# Patient Record
Sex: MALE
[De-identification: names, ages, dates, MRNs, and addresses within clinical notes are randomized per-mention and may not be internally consistent; named-entity substitution may affect disease eponyms.]

---

## 2017-10-19 NOTE — OR
SURGEON:

Anabela Clemons D.O.

 

DATE OF PROCEDURE:  10/19/2017

 

OR STAFF PRESENT:

1. ISABEL Cabral RN.

2. SEVERO West RN.

3. KARIN Claire RN.

 

RADIOLOGY TECH:

BENNY Del Valle.

 

WOUND CLASSIFICATION:

I.

 

PREOPERATIVE DIAGNOSES:

1. Multilevel degenerative disk disease.

2. Herniated disk.

3. Chronic low back pain.

 4. Lumbar radiculopathy



POSTOPERATIVE DIAGNOSES:

1. Multilevel degenerative disk disease.

2. Herniated disk.

3. Chronic low back pain.

 4. Lumbar radiculopathy



PROCEDURES PERFORMED:

1. Intralaminar epidural steroid injection at L3-L4.

2. Fluoroscopic guidance for needle placement.

3. Local with oral Valium for sedation.

 

SCREENING QUESTIONS:

The patient answered "no" to all of the following questions:

1. Are  you allergic to latex?

2. Do you have a bleeding disorder?

3. Do you have any current local or systemic infections?

4. Are you taking any anti-inflammatories or blood thinners?

5. Do you have any joint replacements, heart valve replacements, or a

    pacemaker?

 

DESCRIPTION OF PROCEDURE:

The patient had the procedure thoroughly explained including all possible risks,

benefits and alternatives.  Consent was signed in my clinic indicating

understanding and willingness to proceed.  The patient presented to [facility] and was escorted to the dressing room to disrobe and

change into a hospital gown.  Preoperative vital signs were taken and stable.

The patient reported that Valium was taken prior to the procedure.

 

The patient was brought back to the procedure room and placed in the prone

position on the procedure room table.  A pillow was placed under the hips in

order to flatten the lumbar lordosis.  The back was prepped with ChloraPrep and

sterilely draped.  All personnel in the operating room were dressed in

appropriate attire including surgical scrubs, head and shoe covers.  This was to

ensure sterility while in the treatment room.  During the time fluoroscopy was

in use, all personnel in the operating room wore lead shields with thyroid

collars.  Sterile technique was used throughout the procedure. The patient was

awake and conversant throughout the procedure.  There was no evidence of

infection at the site of needle insertion.  Skeletal landmarks were identified

under fluoroscopy for the lumbar epidural.

 

Skin was anesthetized with 2% lidocaine with a sterile 27-gauge 1.5 inch needle.

Then a 20-gauge Tuohy epidural needle was placed in the epidural space with loss

of resistance technique under fluoroscopic guidance.  No heme, cerebrospinal

fluid, or paresthesias were noted.  Isovue-200 contrast dye was injected in 0.2

cubic centimeter increments and seen to outline the epidural space in both AP

and lateral views.  There was no intravascular flow pattern observed under live

fluoroscopy.  Then 12 milligrams of Celestone was slowly injected after negative

aspiration.  The patient tolerated the procedure well.  Vital signs were stable

during and after the procedure.

 

The staff escorted the patient to the recovery area and the patient was released

in stable condition after a brief stay in the recovery room monitored by the

nurse.  The patient was given both oral and written discharge and follow up

instructions with recommendation to follow up given for 2-3 weeks.

 

The patient voiced understanding including understanding of those signs and

symptoms that would require emergency care.  The patient knows how to contact

the office if there are any additional problems or questions in the meantime.

 

PREOPERATIVE PAIN:

9/10.

 

POSTOPERATIVE PAIN:

7/10.

 

PLAN:

Followup in the Pain Clinic in 3 weeks.

 

 

WAYLON / MARIA EUGENIA

DD:  10/19/2017 14:18:53

DT:  10/19/2017 18:17:15

Job #:  [id number]

CASSIA

## 2017-10-20 NOTE — MR
EXAMINATION: MRI lumbar spine with and without contrast

 

HISTORY: Back pain

 

COMPARISON: MRI dated 7/24/2017

 

TECHNIQUE: Multiplanar and multisequence imaging obtained of the lumbar spine before and following th
e administration of 17 mL of MultiHance.

 

FINDINGS: The lumbar spinal alignment is normal. The vertebral body heights appear well maintained. B
one mineralization is normal. Distal spinal cord appears normal and the conus terminates at L1. Visua
lized retroperitoneal structures are normal. The SI joints are symmetric. There are a few small T1 an
d T2 dark signal foci within the posterior epidural space at L3. No abnormal enhancement is noted.

 

T12-L1: Unremarkable.

L1-L2: Unremarkable.

L2-L3: Moderate broad-based disc protrusion with a slight inferior extrusion component, grossly uncha
nged. There is mild underlying spinal canal stenosis. No significant neural foraminal stenosis.

L3-L4: Tiny diffuse disc bulge without significant spinal canal or neural foraminal stenosis.

L4-L5: Small diffuse disc bulge with minimal spinal canal stenosis. Mild bilateral neural foraminal s
tenosis.

L5-S1: Unremarkable.

 

IMPRESSION: 

1. Multilevel degenerative disc disease most prominent at L2-L3, not significantly changed from the p
rior MRI.

2. Several small dark foci within the posterior epidural space at L3. Likely small foci of air, not u
nexpected, from recent epidural steroid injection.

2. No acute findings demonstrated.

## 2017-10-20 NOTE — EDM.PDOC
ED HPI GENERAL MEDICAL PROBLEM





- General


Stated Complaint: PAIN AND WEAKNESS


Time Seen by Provider: 10/20/17 11:12





- History of Present Illness


INITIAL COMMENTS - FREE TEXT/NARRATIVE: 





HISTORY AND PHYSICAL:





History of present illness:


Patient's 22-year-old white male with history of chronic back pain with 

multilevel disc disease with an epidural injection yesterday by pain management 

and now comes with increasing pain and weakness he has had some pain and 

weakness but states this is worse since the injection seen by his pain 

management physician who referred him here for evaluation and imaging as 

indicated. He denies incontinence or retention of bowel or bladder or other 

concern at this point no fever or chills





Review of systems: 


As per history of present illness and below otherwise all systems reviewed and 

negative.





Past medical history: 


As per history of present illness and as reviewed below otherwise 

noncontributory.





Surgical history: 


As per history of present illness and as reviewed below otherwise 

noncontributory.





Social history: 


No reported history of drug or alcohol abuse.





Family history: 


As per history of present illness and as reviewed below otherwise 

noncontributory.





Physical exam:


HEENT: Atraumatic, normocephalic, pupils reactive, negative for conjunctival 

pallor or scleral icterus, mucous membranes moist, throat clear, neck supple, 

nontender, trachea midline.


Lungs: Clear to auscultation, breath sounds equal bilaterally, chest nontender.


Heart: S1S2, regular, negative for clicks, rubs, or JVD.


Abdomen: Soft, nondistended, nontender. Negative for masses or 

hepatosplenomegaly. Negative for costovertebral tenderness.


Pelvis: Stable nontender.


Genitourinary: Deferred.


Rectal: Deferred.


Extremities: Atraumatic, negative for cords or calf pain. Neurovascular 

unremarkable.


Neuro: Awake, alert, oriented. Cranial nerves II through XII unremarkable. 

Cerebellum unremarkable. Motor and sensory unremarkable throughout. Exam 

nonfocal.


Back: Patient has the injection site noted at the L2 level there is no erythema 

no fluctuance no induration no point vertebral body tenderness motor and 

sensory exam are unremarkable there is no perineal numbness and deep tendon 

reflexes are 2+ bilateral


Diagnostics:


CBC CMP MRI lumbar spine





Therapeutics:


Normal saline Dilaudid 1 mg IV Zofran 4 mg IV





Impression: 


#1 degenerative disc disease #2 observation 24-hour status post epidural 

injection with increased pain/weakness





Definitive disposition and diagnosis as appropriate pending reevaluation and 

review of above.





- Related Data


 Allergies











Allergy/AdvReac Type Severity Reaction Status Date / Time


 


Penicillins Allergy  Cannot Verified 10/20/17 11:51





   Remember  














ED ROS GENERAL





- Review of Systems


Review Of Systems: ROS reveals no pertinent complaints other than HPI.





ED EXAM, GENERAL





- Physical Exam


Exam: See Below (See dictation)





Course





- Vital Signs


Last Recorded V/S: 


 Last Vital Signs











Temp  36.8 C   10/20/17 12:12


 


Pulse  96   10/20/17 12:12


 


Resp  18   10/20/17 12:12


 


BP  130/62   10/20/17 12:12


 


Pulse Ox  97   10/20/17 12:12














- Orders/Labs/Meds


Orders: 


 Active Orders 24 hr











 Category Date Time Status


 


 Lumbar Spine Comp w Cont [MR] Stat Exams  10/20/17 11:38 Stop Req











Labs: 


 Laboratory Tests











  10/20/17 10/20/17 Range/Units





  11:51 11:51 


 


WBC  15.80 H   (4.0-11.0)  K/uL


 


RBC  4.95   (4.50-5.90)  M/uL


 


Hgb  14.9   (13.0-17.0)  g/dL


 


Hct  42.5   (38.0-50.0)  %


 


MCV  85.9   (80.0-98.0)  fL


 


MCH  30.1   (27.0-32.0)  pg


 


MCHC  35.1   (31.0-37.0)  g/dL


 


RDW Std Deviation  41.0   (28.0-62.0)  fl


 


RDW Coeff of Everardo  13   (11.0-15.0)  %


 


Plt Count  290   (150-400)  K/uL


 


MPV  10.80   (7.40-12.00)  fL


 


Neut % (Auto)  82.0 H   (48.0-80.0)  %


 


Lymph % (Auto)  8.6 L   (16.0-40.0)  %


 


Mono % (Auto)  9.3   (0.0-15.0)  %


 


Eos % (Auto)  0.0   (0.0-7.0)  %


 


Baso % (Auto)  0.1   (0.0-1.5)  %


 


Neut # (Auto)  13.0 H   (1.4-5.7)  K/uL


 


Lymph # (Auto)  1.4   (0.6-2.4)  K/uL


 


Mono # (Auto)  1.5 H   (0.0-0.8)  K/uL


 


Eos # (Auto)  0.0   (0.0-0.7)  K/uL


 


Baso # (Auto)  0.0   (0.0-0.1)  K/uL


 


Nucleated RBC %  0.0   /100WBC


 


Nucleated RBCs #  0   K/uL


 


Sodium   141  (136-146)  mmol/L


 


Potassium   3.9  (3.5-5.1)  mmol/L


 


Chloride   108  ()  mmol/L


 


Carbon Dioxide   23  (21-31)  mmol/L


 


BUN   14  (6.0-23.0)  mg/dL


 


Creatinine   0.8  (0.6-1.5)  mg/dL


 


Est Cr Clr Drug Dosing   TNP  


 


Estimated GFR (MDRD)   > 60.0  ml/min


 


Glucose   105  ()  mg/dL


 


Calcium   9.8  (8.8-10.8)  mg/dL


 


Total Bilirubin   0.4  (0.1-1.5)  mg/dL


 


AST   17  (5-40)  IU/L


 


ALT   19  (8-54)  IU/L


 


Alkaline Phosphatase   65  ()  


 


Total Protein   7.5  (6.0-8.0)  g/dL


 


Albumin   4.6  (3.5-5.0)  g/dL


 


Globulin   2.9  (2.0-3.5)  g/dL


 


Albumin/Globulin Ratio   1.6  (1.3-2.8)  











Meds: 


Medications














Discontinued Medications














Generic Name Dose Route Start Last Admin





  Trade Name Freq  PRN Reason Stop Dose Admin


 


Gadobenate Dimeglumine  20 ml  10/20/17 13:02  10/20/17 13:03





  Multihance  IVPUSH  10/20/17 13:03  17 ml





  ONETIME STA   Administration


 


Hydromorphone HCl  1 mg  10/20/17 11:39  10/20/17 11:55





  Dilaudid  IVPUSH  10/20/17 11:40  1 mg





  ONETIME ONE   Administration














Departure





- Departure


Time of Disposition: 14:33


Disposition: Home, Self-Care 01


Condition: Good


Clinical Impression: 


 Degenerative disc disease








- Discharge Information


Referrals: 


Ruperto Gan MD [Primary Care Provider] - 


Additional Instructions: 


The following information is given to patients seen in the emergency department 

who are being discharged to home. This information is to outline your options 

for follow-up care. We provide all patients seen in our emergency department 

with a follow-up referral.





The need for follow-up, as well as the timing and circumstances, are variable 

depending upon the specifics of your emergency department visit.





If you don't have a primary care physician on staff, we will provide you with a 

referral. We always advise you to contact your personal physician following an 

emergency department visit to inform them of the circumstance of the visit and 

for follow-up with them and/or the need for any referrals to a consulting 

specialist.





The emergency department will also refer you to a specialist when appropriate. 

This referral assures that you have the opportunity for followup care with a 

specialist. All of these measure are taken in an effort to provide you with 

optimal care, which includes your followup.





Under all circumstances we always encourage you to contact your private 

physician who remains a resource for coordinating  your care. When calling for 

followup care, please make the office aware that this follow-up is from your 

recent emergency room visit. If for any reason you are refused follow-up, 

please contact the [facility] emergency department at [phone] 

and asked to speak to the emergency department charge nurse.

















Follow-up primary medical doctor in pain clinic as discussed continue current 

medications return as needed as discussed





- My Orders


Last 24 Hours: 


My Active Orders





10/20/17 11:38


Lumbar Spine Comp w Cont [MR] Stat 














- Assessment/Plan


Last 24 Hours: 


My Active Orders





10/20/17 11:38


Lumbar Spine Comp w Cont [MR] Stat

## 2020-11-17 ENCOUNTER — HOSPITAL ENCOUNTER (OUTPATIENT)
Dept: HOSPITAL 56 - MW.SDS | Age: 25
Discharge: HOME | End: 2020-11-17
Attending: SURGERY
Payer: COMMERCIAL

## 2020-11-17 DIAGNOSIS — F17.210: ICD-10-CM

## 2020-11-17 DIAGNOSIS — R19.7: ICD-10-CM

## 2020-11-17 DIAGNOSIS — R93.3: Primary | ICD-10-CM

## 2020-11-17 DIAGNOSIS — K29.00: ICD-10-CM

## 2020-11-17 DIAGNOSIS — K29.50: ICD-10-CM

## 2020-11-17 DIAGNOSIS — Z98.890: ICD-10-CM

## 2020-11-17 DIAGNOSIS — J45.909: ICD-10-CM

## 2020-11-17 DIAGNOSIS — Z79.899: ICD-10-CM

## 2020-11-17 DIAGNOSIS — Z98.1: ICD-10-CM

## 2020-11-17 DIAGNOSIS — A04.8: ICD-10-CM

## 2020-11-17 DIAGNOSIS — K92.1: ICD-10-CM

## 2020-11-17 DIAGNOSIS — Z88.0: ICD-10-CM

## 2020-11-17 PROCEDURE — 43239 EGD BIOPSY SINGLE/MULTIPLE: CPT

## 2020-11-17 PROCEDURE — 45380 COLONOSCOPY AND BIOPSY: CPT

## 2020-11-17 NOTE — PCM.OPNOTE
- General Post-Op/Procedure Note


Date of Surgery/Procedure: 11/17/20


Operative Procedure(s): Diagnostic EGD and colonoscopy


Findings: 


Acute gastritis, normal colon 


Pre Op Diagnosis: H pylori, colon abnormality on CT


Post-Op Diagnosis: Acute gastritis


Anesthesia Technique: MAC


Primary Surgeon: Rhina Stephen


Condition: Good


Free Text/Narrative:: 


                                 Intake & Output











 11/16/20 11/17/20 11/17/20





 22:59 06:59 14:59


 


Intake Total   1400


 


Balance   1400

## 2020-11-17 NOTE — PCM.POSTAN
POST ANESTHESIA ASSESSMENT





- MENTAL STATUS


Mental Status: Alert, Oriented





- VITAL SIGNS


Vital Signs: 


                                Last Vital Signs











Temp  36.5 C   11/17/20 10:00


 


Pulse  69   11/17/20 11:53


 


Resp  18   11/17/20 11:53


 


BP  112/60   11/17/20 11:53


 


Pulse Ox  95   11/17/20 11:53














- RESPIRATORY


Respiratory Status: Respiratory Rate WNL, Airway Patent, O2 Saturation Stable





- CARDIOVASCULAR


CV Status: Pulse Rate WNL, Blood Pressure Stable





- GASTROINTESTINAL


GI Status: No Symptoms





- PAIN


Pain Score: 0





- POST OP HYDRATION


Hydration Status: Adequate & Stable





- OBSERVATIONS


Free Text/Narrative:: 





No anesthesia problems

## 2020-11-17 NOTE — PCM.PREANE
Preanesthetic Assessment





- Anesthesia/Transfusion/Family Hx


Anesthesia History: Prior Anesthesia Without Reaction


Family History of Anesthesia Reaction: No


Transfusion History: No Prior Transfusion(s)


Intubation History: Unknown





- Review of Systems


General: No Symptoms


Pulmonary: No Symptoms


Cardiovascular: No Symptoms


Gastrointestinal: Abdominal Pain, Diarrhea, Melena, Nausea


Neurological: No Symptoms


Other: Reports: None





- Physical Assessment


Vital Signs: 





                                Last Vital Signs











Temp  36.5 C   11/17/20 10:00


 


Pulse  78   11/17/20 10:00


 


Resp  16   11/17/20 10:00


 


BP  128/71   11/17/20 10:00


 


Pulse Ox  97   11/17/20 10:00











Height: 5 ft 10 in


Weight: 85.729 kg


ASA Class: 2


Mental Status: Alert & Oriented x3


Airway Class: Mallampati = 1


Dentition: Reports: Normal Dentition


Thyro-Mental Finger Breadths: 3


Mouth Opening Finger Breadths: 3


ROM/Head Extension: Full


Lungs: Clear to Auscultation, Normal Respiratory Effort


Cardiovascular: Regular Rate, Regular Rhythm





- Allergies


Allergies/Adverse Reactions: 


                                    Allergies











Allergy/AdvReac Type Severity Reaction Status Date / Time


 


Penicillins Allergy  Cannot Verified 11/17/20 09:56





   Remember  














- Blood


Blood Available: No





- Anesthesia Plan


Pre-Op Medication Ordered: None





- Acknowledgements


Anesthesia Type Planned: MAC


Pt an Appropriate Candidate for the Planned Anesthesia: Yes


Alternatives and Risks of Anesthesia Discussed w Pt/Guardian: Yes


Pt/Guardian Understands and Agrees with Anesthesia Plan: Yes





PreAnesthesia Questionnaire


HEENT History: Reports: Other (See Below)


Other HEENT History: wears glasses


Cardiovascular History: Reports: None


Respiratory History: Reports: Asthma


Gastrointestinal History: Reports: Chronic Diarrhea


Other Gastrointestinal History: intermittent abd pain,


Genitourinary History: Reports: None


Musculoskeletal History: Reports: Back Pain, Chronic (chronic opioid use for 

back pain), Fracture


Other Musculoskeletal History: hx fx back and collarbone


Neurological History: Reports: None


Psychiatric History: Reports: None


Endocrine/Metabolic History: Reports: None


Hematologic History: Reports: None


Immunologic History: Reports: None


Oncologic (Cancer) History: Reports: None


Dermatologic History: Reports: None





- Infectious Disease History


Infectious Disease History: Reports: None





- Past Surgical History


Head Surgeries/Procedures: Reports: None


HEENT Surgical History: Reports: Myringotomy w Tube(s), Tonsillectomy


Cardiovascular Surgical History: Reports: None


Respiratory Surgical History: Reports: None


GI Surgical History: Reports: None


Male  Surgical History: Reports: None


Endocrine Surgical History: Reports: None


Neurological Surgical History: Reports: Spinal Fusion (02/17 - pain got worse)


Other Musculoskeletal Surgeries/Procedures:: hx left collarbone reconstruction


Oncologic Surgical History: Reports: None


Dermatological Surgical History: Reports: None





- SUBSTANCE USE


Tobacco Use Status *Q: Current Every Day Tobacco User (1/2-1 ppd)


Tobacco Use Within Last Twelve Months: Cigarettes


Recreational Drug Type: Reports: Marijuana/Hashish





- HOME MEDS


Home Medications: 


                                    Home Meds





Albuterol [Ventolin HFA] 2 puff INH ASDIRECTED PRN 11/11/20 [History]


Gabapentin [Neurontin] 2 tab PO TID 11/11/20 [History]


Omeprazole 40 mg PO DAILY 11/11/20 [History]


Sucralfate 1 gm PO QID 11/11/20 [History]











- CURRENT (IN HOUSE) MEDS


Current Meds: 





                               Current Medications





Lactated Ringer's (Ringers, Lactated)  1,000 mls @ 125 mls/hr IV ASDIRECTED SILVIA


   Last Admin: 11/17/20 09:55 Dose:  125 mls/hr


   Documented by: 


Sodium Chloride (Saline Flush)  10 ml FLUSH ASDIRECTED PRN


   PRN Reason: Keep Vein Open


Sodium Chloride (Saline Flush)  2.5 ml FLUSH ASDIRECTED PRN


   PRN Reason: Keep Vein Open


Sodium Chloride (Saline Flush)  10 ml FLUSH ASDIRECTED PRN


   PRN Reason: Keep Vein Open


Sodium Chloride (Saline Flush)  2.5 ml FLUSH ASDIRECTED PRN


   PRN Reason: Keep Vein Open


Sodium Chloride (Normal Saline)  10 ml IV ASDIRECTED PRN


   PRN Reason: IV Use





Discontinued Medications





Fentanyl (Sublimaze) Confirm Administered Dose 100 mcg .ROUTE .STK-MED ONE


   Stop: 11/17/20 10:08


Lidocaine HCl (Xylocaine-Mpf 1%) Confirm Administered Dose 5 ml .ROUTE .STK-MED 

ONE


   Stop: 11/17/20 10:08


Midazolam HCl (Versed 1 Mg/Ml) Confirm Administered Dose 2 mg .ROUTE .STK-MED 

ONE


   Stop: 11/17/20 10:08


Propofol (Diprivan  20 Ml) Confirm Administered Dose 400 mg .ROUTE .STK-MED ONE


   Stop: 11/17/20 10:08


Propofol (Diprivan  20 Ml) Confirm Administered Dose 200 mg .ROUTE .[facility]-MED ONE


   Stop: 11/17/20 10:09

## 2020-11-17 NOTE — PCM48HPAN
Post Anesthesia Note





- EVALUATION WITHIN 48HRS OF ANESTHETIC


Vital Signs in Normal Range: Yes


Patient Participated in Evaluation: Yes


Respiratory Function Stable: Yes


Airway Patent: Yes


Cardiovascular Function Stable: Yes


Hydration Status Stable: Yes


Pain Control Satisfactory: Yes


Nausea and Vomiting Control Satisfactory: Yes


Mental Status Recovered: Yes


Vital Signs: 


                                Last Vital Signs











Temp  36.5 C   11/17/20 10:00


 


Pulse  69   11/17/20 11:53


 


Resp  18   11/17/20 11:53


 


BP  112/60   11/17/20 11:53


 


Pulse Ox  95   11/17/20 11:53














- COMMENTS/OBSERVATIONS


Free Text/Narrative:: 





No anesthesia problems

## 2020-11-18 NOTE — OR
SURGEON:

RHINA STEPHEN MD

 

DATE OF PROCEDURE:  11/17/2020

 

PREOPERATIVE DIAGNOSES:

1. Helicobacter pylori infection.

2. Colon abnormality on CT scan.

 

POSTOPERATIVE DIAGNOSES:

1. Acute gastritis.

2. Normal colon.

 

PROCEDURE PERFORMED:

Diagnostic esophagogastroduodenoscopy and colonoscopy.

 

PRIMARY SURGEON:

Rhina Stephen MD

 

ANESTHESIA:

MAC.

 

INSTRUMENT USED:

Olympus endoscope and colonoscope.

 

EXTENT OF EXAM:

To the second portion of duodenum, to the cecum.

 

PREPARATION:

Good.

 

LIMITATIONS:

None.

 

INDICATIONS FOR EXAMINATION:

The patient is a 25-year-old male who presented to my clinic with severe upper

abdominal pain.  A CT scan was done that day that showed thickening along the

descending colon.  The patient underwent stool testing, was found to be H pylori

positive.  He was placed on treatment and his abdominal pain has slowly been

resolving.  The decision was made to still proceed with diagnostic EGD and

colonoscopy to determine if the patient has ulcers and to see what is going on

in the colon.  I explained the procedure, expected perioperative course, and the

risks.  The patient verbalized understanding and wishes to proceed.

 

PROCEDURE IN DETAIL:

The patient was brought into the endoscopy suite and placed in the left lateral

decubitus position.  A time-out was completed verifying the patient's name, age,

date of birth, allergies, and procedure to be performed.  A bite block was

placed in the patient's mouth and monitored anesthesia care was induced.

Continuous oxygen was provided via nasal cannula throughout the procedure.

After adequate sedation was achieved, a well-lubricated endoscope was placed in

the patient's mouth and advanced under direct visualization to the second

portion of duodenum.  This appeared normal and a photograph was taken.  The

scope was then fully withdrawn while examining the color, texture, anatomy, and

integrity of the mucosa of the upper GI tract.  The duodenal mucosa appeared

normal.  There was no evidence of any ulcers or inflammation.  The scope was

brought into the stomach.  A photograph was taken of the pylorus and GE

junction, which appeared anatomically normal.  Diffusely, the gastric mucosa

appeared inflamed, but I saw no evidence of ulceration.  Biopsies were taken of

the gastric antrum, body, and fundus and sent for histologic review and H pylori

testing.  The scope was brought into the distal esophagus.  The Z-line appeared

normal.  The distal esophageal mucosa appeared normal.  A photograph was taken

of the Z-line.  The remainder of the esophageal mucosa was free of pathology.

The scope was removed and this portion of the procedure terminated.  A digital

rectal exam was performed.  This appeared normal.  A well-lubricated colonoscope

was inserted in the rectum and advanced under direct visualization to the level

of the cecum.  The cecum was identified by both visual and anatomic landmarks.

A photograph was taken of the cecal cap as well as with the scope retroflexed

within the cecum.  I also took a photograph of the scope within the terminal

ileum.  All of these appeared normal.  The scope was then straightened out and

fully withdrawn while examining the color, texture, anatomy, and integrity of

the mucosa from the cecum to the anal canal.  The findings were consistent with

normal colonic mucosa.  Random biopsies were taken in the cecum, transverse

colon, descending colon, sigmoid colon as well as the rectum and sent to

pathology for histologic review.  The scope was retroflexed within the rectum to

allow visualization of the anal canal opening.  This appeared normal and a

photograph was taken.  The scope was then straightened out and fully withdrawn.

The cecum to anus time was 7 minutes.  The patient tolerated the procedure well

and was transferred to the PACU in stable condition.

 

ENDOSCOPIC DIAGNOSIS:

Acute gastritis.

 

RECOMMENDATIONS:

We will follow up with the patient in clinic in 2 weeks.  In the meantime, he

should continue his antibiotic regimen for his H pylori infection.

 

 

TAWANA DEL CID

DD:  11/18/2020 12:18:45

DT:  11/18/2020 13:22:50

Job #:  [id number]